# Patient Record
Sex: MALE | Race: WHITE | NOT HISPANIC OR LATINO
[De-identification: names, ages, dates, MRNs, and addresses within clinical notes are randomized per-mention and may not be internally consistent; named-entity substitution may affect disease eponyms.]

---

## 2021-04-07 ENCOUNTER — TRANSCRIPTION ENCOUNTER (OUTPATIENT)
Age: 66
End: 2021-04-07

## 2023-05-12 PROBLEM — Z00.00 ENCOUNTER FOR PREVENTIVE HEALTH EXAMINATION: Status: ACTIVE | Noted: 2023-05-12

## 2023-06-20 ENCOUNTER — APPOINTMENT (OUTPATIENT)
Dept: CARDIOLOGY | Facility: CLINIC | Age: 68
End: 2023-06-20
Payer: MEDICARE

## 2023-06-20 ENCOUNTER — NON-APPOINTMENT (OUTPATIENT)
Age: 68
End: 2023-06-20

## 2023-06-20 VITALS
WEIGHT: 208 LBS | HEIGHT: 69.5 IN | HEART RATE: 72 BPM | DIASTOLIC BLOOD PRESSURE: 80 MMHG | OXYGEN SATURATION: 98 % | SYSTOLIC BLOOD PRESSURE: 140 MMHG | BODY MASS INDEX: 30.12 KG/M2

## 2023-06-20 DIAGNOSIS — Z86.19 PERSONAL HISTORY OF OTHER INFECTIOUS AND PARASITIC DISEASES: ICD-10-CM

## 2023-06-20 DIAGNOSIS — Z86.79 PERSONAL HISTORY OF OTHER DISEASES OF THE CIRCULATORY SYSTEM: ICD-10-CM

## 2023-06-20 PROCEDURE — 99204 OFFICE O/P NEW MOD 45 MIN: CPT | Mod: 25

## 2023-06-20 PROCEDURE — 93000 ELECTROCARDIOGRAM COMPLETE: CPT

## 2023-06-20 RX ORDER — ROSUVASTATIN CALCIUM 10 MG/1
10 TABLET, FILM COATED ORAL
Refills: 0 | Status: ACTIVE | COMMUNITY

## 2024-05-16 ENCOUNTER — RESULT REVIEW (OUTPATIENT)
Age: 69
End: 2024-05-16

## 2024-06-11 ENCOUNTER — APPOINTMENT (OUTPATIENT)
Dept: CARDIOLOGY | Facility: CLINIC | Age: 69
End: 2024-06-11
Payer: MEDICARE

## 2024-06-11 VITALS
DIASTOLIC BLOOD PRESSURE: 92 MMHG | OXYGEN SATURATION: 96 % | SYSTOLIC BLOOD PRESSURE: 128 MMHG | HEART RATE: 69 BPM | WEIGHT: 205 LBS | HEIGHT: 69.5 IN | BODY MASS INDEX: 29.68 KG/M2

## 2024-06-11 DIAGNOSIS — I77.819 AORTIC ECTASIA, UNSPECIFIED SITE: ICD-10-CM

## 2024-06-11 DIAGNOSIS — I35.0 NONRHEUMATIC AORTIC (VALVE) STENOSIS: ICD-10-CM

## 2024-06-11 PROCEDURE — 99204 OFFICE O/P NEW MOD 45 MIN: CPT

## 2024-06-11 PROCEDURE — 99214 OFFICE O/P EST MOD 30 MIN: CPT

## 2024-06-11 PROCEDURE — 93000 ELECTROCARDIOGRAM COMPLETE: CPT

## 2024-07-25 ENCOUNTER — APPOINTMENT (OUTPATIENT)
Dept: SURGERY | Facility: CLINIC | Age: 69
End: 2024-07-25
Payer: MEDICARE

## 2024-07-25 VITALS
OXYGEN SATURATION: 96 % | BODY MASS INDEX: 30.26 KG/M2 | WEIGHT: 209 LBS | DIASTOLIC BLOOD PRESSURE: 46 MMHG | TEMPERATURE: 98.1 F | SYSTOLIC BLOOD PRESSURE: 138 MMHG | HEIGHT: 69.5 IN

## 2024-07-25 PROCEDURE — 99203 OFFICE O/P NEW LOW 30 MIN: CPT

## 2024-07-26 NOTE — PHYSICAL EXAM
[de-identified] : Looks well no acute distress complaining of some soreness around the umbilicus [de-identified] : No skin changes around umbilicus obvious umbilical hernia on visual inspection 2 cm preperitoneal fat incarcerated in hernia reducible with significant manual decompression  Sensitive on decompression no other scars no inguinal hernias right side is slightly asymmetrical to the left but no definitive hernia and certainly no symptomatic herniation

## 2024-07-26 NOTE — HISTORY OF PRESENT ILLNESS
---------------------  From: Karlos Berry LPN   To: [facility] Message Pool ([id number] [place]);     Sent: 6/9/2021 9:21:53 AM CDT  Subject: General Message     Phone message    PCP:   Hieu CARVAJAL      Time of Call:  _ 900       Person Calling:  _ Self  Phone number:  _ [phone]    Returned call at: _    Note:   _ Pt very upset that he is being told he would need to provide documentation of his dog being a service dog in order to bring into the appt today.  Says that [facility] once wrote him a note saying the animal provides medical support to him.  Wants a new copy or an FYI placed in his chart.    Last office visit and reason:  _I deferred this message to my clinic manager Gabriela Krueger. Provided her with letters written by Dr. Mendes and Dr. King in support of his dog/service/ animal.Mailed letters to pt. Pt notified and states understanding.   [de-identified] : Patient is a relatively healthy man mild obesity with 1 day complaining of periumbilical pain after having a screening ultrasound for abdominal aneurysm.  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

## 2024-07-26 NOTE — HISTORY OF PRESENT ILLNESS
[de-identified] : Patient is a relatively healthy man mild obesity with 1 day complaining of periumbilical pain after having a screening ultrasound for abdominal aneurysm.  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

## 2024-07-26 NOTE — PLAN
[FreeTextEntry1] : Plan on having him speak with scheduling regarding robotic umbilical hernia repair we talked about the procedure and the expected downtime he is going to plan on doing the surgery after he gets back from vacation that he has had planned with this this is completely appropriate  He will see Dr. Joy for preoperative clearance  I spent 30 minutes on this examining the patient reviewing the operative procedure and planning stage and coordinating care with other providers we will see him back at the time of operation always available to talk to him if he has any questions

## 2024-07-26 NOTE — PHYSICAL EXAM
[de-identified] : Looks well no acute distress complaining of some soreness around the umbilicus [de-identified] : No skin changes around umbilicus obvious umbilical hernia on visual inspection 2 cm preperitoneal fat incarcerated in hernia reducible with significant manual decompression  Sensitive on decompression no other scars no inguinal hernias right side is slightly asymmetrical to the left but no definitive hernia and certainly no symptomatic herniation

## 2024-07-26 NOTE — REASON FOR VISIT
[Initial Evaluation] : an initial evaluation [FreeTextEntry1] : Patient referred by Dr. Joy for the evaluation of a symptomatic umbilical hernia

## 2024-07-26 NOTE — ASSESSMENT
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

## 2024-08-24 ENCOUNTER — RESULT REVIEW (OUTPATIENT)
Age: 69
End: 2024-08-24

## 2024-08-26 ENCOUNTER — APPOINTMENT (OUTPATIENT)
Dept: CARDIOLOGY | Facility: CLINIC | Age: 69
End: 2024-08-26
Payer: MEDICARE

## 2024-08-26 DIAGNOSIS — I35.0 NONRHEUMATIC AORTIC (VALVE) STENOSIS: ICD-10-CM

## 2024-08-26 PROCEDURE — 99213 OFFICE O/P EST LOW 20 MIN: CPT

## 2024-08-27 VITALS — OXYGEN SATURATION: 97 % | DIASTOLIC BLOOD PRESSURE: 60 MMHG | HEART RATE: 68 BPM | SYSTOLIC BLOOD PRESSURE: 122 MMHG

## 2024-08-27 NOTE — REASON FOR VISIT
[FreeTextEntry1] : Comes today for clearance for ventral hernia repair with Dr. Van 9/18/24 Feeling well Denies CP/SOB/palps No lightheadedness or syncope No exertional symptoms

## 2024-08-27 NOTE — REVIEW OF SYSTEMS
[Weight Gain (___ Lbs)] : no recent weight gain [Weight Loss (___ Lbs)] : no recent weight loss [SOB] : no shortness of breath [Dyspnea on exertion] : not dyspnea during exertion [Chest Discomfort] : no chest discomfort [Lower Ext Edema] : no extremity edema [Palpitations] : no palpitations [Syncope] : no syncope [Myalgia] : no myalgia [Dizziness] : no dizziness [Easy Bleeding] : no tendency for easy bleeding [Easy Bruising] : no tendency for easy bruising [Negative] : Respiratory

## 2024-08-27 NOTE — CARDIOLOGY SUMMARY
[de-identified] : 5/2024 normal LV function and size EF 55-60%, dilated Aortic root 4.2 cm, Asc Aorta 4.4cm, mild to mod AS, AV not well visualized, Aortic valve are 1.45 cm2  TTE 5/24/2023-Normal LV systolic and diastolic fx, normal RV, AV not well seen, cannot r/o bicuspid AV, it appears quite thickened, mild AS, pk/mn grad 18/12 mmHg, DARIAN 1.92 cm2, trace MR, root 4.0 cm, prox AS 4.5 cm, no prior avail

## 2024-08-27 NOTE — HISTORY OF PRESENT ILLNESS
[FreeTextEntry1] : 69 M, remote smoking hx, FH heart dz, high chol, noted to have murmur, and AS on  TTE.  Father ASHD, CABG Mother AVR  As per pt, about 6-7 yr ago he had high fever 89 F severe fatigue and was eventually dx'ed w Lyme dz.  He was treated at [facility], noted to have murmur, underwent JENNIFER, BCX reportedly negative.  He had undergone some dental procedures prior to that as well.  Records are not.  Subsequently he f/u w cardiologist once in .  He reports good health in general, does not feel limited, walks his dog for 2-3 miles regularly.

## 2024-09-09 ENCOUNTER — RESULT REVIEW (OUTPATIENT)
Age: 69
End: 2024-09-09

## 2024-09-09 ENCOUNTER — TRANSCRIPTION ENCOUNTER (OUTPATIENT)
Age: 69
End: 2024-09-09

## 2024-09-09 ENCOUNTER — APPOINTMENT (OUTPATIENT)
Dept: SURGERY | Facility: HOSPITAL | Age: 69
End: 2024-09-09

## 2024-09-18 ENCOUNTER — NON-APPOINTMENT (OUTPATIENT)
Age: 69
End: 2024-09-18

## 2024-09-19 ENCOUNTER — APPOINTMENT (OUTPATIENT)
Dept: SURGERY | Facility: CLINIC | Age: 69
End: 2024-09-19
Payer: MEDICARE

## 2024-09-19 VITALS
SYSTOLIC BLOOD PRESSURE: 130 MMHG | DIASTOLIC BLOOD PRESSURE: 74 MMHG | OXYGEN SATURATION: 98 % | TEMPERATURE: 97.9 F | HEART RATE: 67 BPM

## 2024-09-19 DIAGNOSIS — K42.0 UMBILICAL HERNIA WITH OBSTRUCTION, W/OUT GANGRENE: ICD-10-CM

## 2024-09-19 PROCEDURE — 99212 OFFICE O/P EST SF 10 MIN: CPT

## 2024-09-20 NOTE — PHYSICAL EXAM
[de-identified] : Looks well no distress [de-identified] : No evidence of seroma or recurrent hernia or infection

## 2024-09-20 NOTE — HISTORY OF PRESENT ILLNESS
[de-identified] : Patient underwent an uneventful robotic ventral hernia repair on September 9, 2024.  He is doing well and reports no issues with his umbilicus which is inverted and appears normal as well as his trocar sites appear normal as well

## 2024-09-20 NOTE — REASON FOR VISIT
[Post Op: _________] : a [unfilled] post op visit [FreeTextEntry1] : Patient is status post robotic preperitoneal umbilical hernia repair on September 9, 2024

## 2024-10-24 ENCOUNTER — APPOINTMENT (OUTPATIENT)
Dept: SURGERY | Facility: CLINIC | Age: 69
End: 2024-10-24
Payer: MEDICARE

## 2024-10-24 VITALS
HEART RATE: 63 BPM | OXYGEN SATURATION: 97 % | DIASTOLIC BLOOD PRESSURE: 76 MMHG | SYSTOLIC BLOOD PRESSURE: 116 MMHG | TEMPERATURE: 97.9 F

## 2024-10-24 DIAGNOSIS — K42.0 UMBILICAL HERNIA WITH OBSTRUCTION, W/OUT GANGRENE: ICD-10-CM

## 2024-10-24 PROCEDURE — 99212 OFFICE O/P EST SF 10 MIN: CPT

## 2025-06-10 ENCOUNTER — APPOINTMENT (OUTPATIENT)
Dept: CARDIOLOGY | Facility: CLINIC | Age: 70
End: 2025-06-10
Payer: MEDICARE

## 2025-06-10 ENCOUNTER — NON-APPOINTMENT (OUTPATIENT)
Age: 70
End: 2025-06-10

## 2025-06-10 VITALS
BODY MASS INDEX: 29.39 KG/M2 | SYSTOLIC BLOOD PRESSURE: 112 MMHG | WEIGHT: 203 LBS | HEIGHT: 69.5 IN | DIASTOLIC BLOOD PRESSURE: 78 MMHG

## 2025-06-10 PROCEDURE — 93000 ELECTROCARDIOGRAM COMPLETE: CPT

## 2025-06-10 PROCEDURE — 99214 OFFICE O/P EST MOD 30 MIN: CPT

## 2025-06-10 RX ORDER — PSYLLIUM HUSK 0.4 G
CAPSULE ORAL
Refills: 0 | Status: ACTIVE | COMMUNITY

## 2025-06-10 RX ORDER — FAMOTIDINE 10 MG/1
TABLET, FILM COATED ORAL
Refills: 0 | Status: ACTIVE | COMMUNITY

## 2025-06-10 RX ORDER — UBIDECARENONE/VIT E ACET 100MG-5
CAPSULE ORAL
Refills: 0 | Status: ACTIVE | COMMUNITY

## 2025-06-10 RX ORDER — MULTIVIT-MIN/IRON/FOLIC ACID/K 18-600-40
CAPSULE ORAL
Refills: 0 | Status: ACTIVE | COMMUNITY

## 2025-07-08 ENCOUNTER — APPOINTMENT (OUTPATIENT)
Dept: CARDIOLOGY | Facility: CLINIC | Age: 70
End: 2025-07-08

## 2025-07-10 ENCOUNTER — RESULT REVIEW (OUTPATIENT)
Age: 70
End: 2025-07-10